# Patient Record
Sex: MALE | Race: WHITE
[De-identification: names, ages, dates, MRNs, and addresses within clinical notes are randomized per-mention and may not be internally consistent; named-entity substitution may affect disease eponyms.]

---

## 2017-12-11 ENCOUNTER — HOSPITAL ENCOUNTER (OUTPATIENT)
Dept: HOSPITAL 39 - GMAJ | Age: 37
Discharge: HOME | End: 2017-12-11
Attending: FAMILY MEDICINE
Payer: COMMERCIAL

## 2017-12-11 DIAGNOSIS — Z00.00: Primary | ICD-10-CM

## 2019-12-13 ENCOUNTER — HOSPITAL ENCOUNTER (OUTPATIENT)
Dept: HOSPITAL 39 - AMB | Age: 39
Discharge: HOME | End: 2019-12-13
Attending: SPECIALIST
Payer: COMMERCIAL

## 2019-12-13 VITALS — SYSTOLIC BLOOD PRESSURE: 122 MMHG | DIASTOLIC BLOOD PRESSURE: 74 MMHG

## 2019-12-13 VITALS — OXYGEN SATURATION: 97 %

## 2019-12-13 DIAGNOSIS — Z88.5: ICD-10-CM

## 2019-12-13 DIAGNOSIS — G89.18: ICD-10-CM

## 2019-12-13 DIAGNOSIS — K64.4: ICD-10-CM

## 2019-12-13 DIAGNOSIS — E78.00: ICD-10-CM

## 2019-12-13 DIAGNOSIS — K59.00: ICD-10-CM

## 2019-12-13 DIAGNOSIS — Z79.899: ICD-10-CM

## 2019-12-13 DIAGNOSIS — K64.8: Primary | ICD-10-CM

## 2019-12-13 DIAGNOSIS — Z88.6: ICD-10-CM

## 2019-12-13 PROCEDURE — 46221 LIGATION OF HEMORRHOID(S): CPT

## 2019-12-13 PROCEDURE — 00902 ANES ANORECTAL PX: CPT

## 2019-12-13 PROCEDURE — 64450 NJX AA&/STRD OTHER PN/BRANCH: CPT

## 2019-12-13 NOTE — OP
DATE OF PROCEDURE:  12/13/19



PREOPERATIVE DIAGNOSIS: 

1.  Anal bleeding.



POSTOPERATIVE DIAGNOSIS: 

1.  Hemorrhoids, internal and external, grade 3 on exam.



PROCEDURE: 

1.  Hemorrhoid banding times 3.

2.  Pudendal nerve block, bilateral, for postoperative pain control.  



SURGEON:  Az Cárdenas MD



ANESTHESIA: General and local.



PROCEDURE:  The patient had some back pain, so we did not roll him prone, so we 
put him in the lithotomy position which he felt would be comfortable for him.  
He was prepped and draped in sterile fashion.  On exam, he did have some 
external hemorrhoids as noted, not thrombosed and not significant on the 
posterior right with a little bit larger one, kind of a tag accentuated from his
relaxation.  It was not that apparent in the office.  Digital rectal exam was 
normal.  On exam, no fissure or fistula were seen.  On gentle anal speculum 
exam, there was some significant internal hemorrhoids seen.  The left anterior 
showed stigmata of recent bleed.  A band was placed.  We positioned a couple of 
times to make sure we got well proximal to the dentate line.  There was also 
larger complex in the right posterior.  This was banded likewise above the 
transition point and then another one more towards on the midline on the left 
for a third band.  No other significant internal hemorrhoids were seen.  Again, 
there were some external components.  He was advised this could swell after 
banding.  Although rare, it is possible.  We will see how he does with the 
banding.  It does appear this was the reason for his bleeding and if it is 
resolved, we should not need a colonoscopy at this time given his age of 39.  He
was then awakened and taken to Recovery to be discharged.



#[id number]

[facility]

## 2020-06-18 ENCOUNTER — HOSPITAL ENCOUNTER (OUTPATIENT)
Dept: HOSPITAL 39 - GMAJ | Age: 40
End: 2020-06-18
Attending: FAMILY MEDICINE
Payer: COMMERCIAL

## 2020-06-18 DIAGNOSIS — Z00.00: Primary | ICD-10-CM

## 2023-10-04 ENCOUNTER — APPOINTMENT (RX ONLY)
Dept: URBAN - NONMETROPOLITAN AREA CLINIC 20 | Facility: CLINIC | Age: 43
Setting detail: DERMATOLOGY
End: 2023-10-04

## 2023-10-04 DIAGNOSIS — D22 MELANOCYTIC NEVI: ICD-10-CM

## 2023-10-04 DIAGNOSIS — L57.8 OTHER SKIN CHANGES DUE TO CHRONIC EXPOSURE TO NONIONIZING RADIATION: ICD-10-CM

## 2023-10-04 PROBLEM — D22.5 MELANOCYTIC NEVI OF TRUNK: Status: ACTIVE | Noted: 2023-10-04

## 2023-10-04 PROCEDURE — 99203 OFFICE O/P NEW LOW 30 MIN: CPT

## 2023-10-04 PROCEDURE — ? COUNSELING

## 2023-10-04 ASSESSMENT — LOCATION SIMPLE DESCRIPTION DERM
LOCATION SIMPLE: RIGHT LOWER BACK
LOCATION SIMPLE: LEFT UPPER BACK
LOCATION SIMPLE: LEFT FOREARM
LOCATION SIMPLE: RIGHT FOREARM

## 2023-10-04 ASSESSMENT — LOCATION ZONE DERM
LOCATION ZONE: TRUNK
LOCATION ZONE: ARM

## 2023-10-04 ASSESSMENT — LOCATION DETAILED DESCRIPTION DERM
LOCATION DETAILED: RIGHT DISTAL DORSAL FOREARM
LOCATION DETAILED: RIGHT SUPERIOR MEDIAL MIDBACK
LOCATION DETAILED: LEFT DISTAL RADIAL DORSAL FOREARM
LOCATION DETAILED: LEFT SUPERIOR LATERAL UPPER BACK